# Patient Record
Sex: FEMALE
[De-identification: names, ages, dates, MRNs, and addresses within clinical notes are randomized per-mention and may not be internally consistent; named-entity substitution may affect disease eponyms.]

---
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
## 2018-03-09 ENCOUNTER — HOSPITAL ENCOUNTER (EMERGENCY)
Dept: HOSPITAL 31 - C.ER | Age: 18
Discharge: HOME | End: 2018-03-09
Payer: MEDICAID

## 2018-03-09 VITALS — BODY MASS INDEX: 24.3 KG/M2

## 2018-03-09 VITALS
OXYGEN SATURATION: 99 % | TEMPERATURE: 98.3 F | HEART RATE: 89 BPM | SYSTOLIC BLOOD PRESSURE: 106 MMHG | DIASTOLIC BLOOD PRESSURE: 71 MMHG | RESPIRATION RATE: 18 BRPM

## 2018-03-09 DIAGNOSIS — H60.92: Primary | ICD-10-CM

## 2018-03-09 NOTE — C.PDOC
History Of Present Illness


17-YEAR-OLD FEMALE, PRESENTS TO THE EMERGENCY DEPARTMENT WITH COMPLAINTS OF L 

EAR PAIN SINCE YEST. NO TRAUMA, CO "PRESSURE" TO AREA. NO FB, HEARING LOSS, URI 

SX, FEVER





EXAM


NAD


HEENT +EARLY OTITIS EXTERNA TM INTACT, SHINY NO EFFUSION. R TM WNL. NO 

RHINORRHEA


REMAINDE RNEG


Time Seen by Provider: 03/09/18 09:47


Chief Complaint (Nursing): ENT Problem


History Per: Patient


History/Exam Limitations: None





Past Medical History


Reviewed: Historical Data, Nursing Documentation, Vital Signs


Vital Signs: 


 Last Vital Signs











Temp  98.3 F   03/09/18 09:34


 


Pulse  89   03/09/18 09:34


 


Resp  18   03/09/18 09:34


 


BP  106/71   03/09/18 09:34


 


Pulse Ox  99   03/09/18 10:45














- [facility] Procedures








EXC LES SOFT TISSUE NEC (05/29/15)


OTHER LOCAL DESTRUC SKIN (03/12/15)








Family History: States: No Known Family Hx





- Social History


Hx Alcohol Use: No


Hx Substance Use: No





- Immunization History


Hx Tetanus Toxoid Vaccination: No


Hx Influenza Vaccination: No


Hx Pneumococcal Vaccination: No





Review Of Systems


ENT: Positive for: Ear Pain (+pressure).  Negative for: Ear Discharge, Nose 

Discharge, Nose Congestion, Throat Pain, Throat Swelling


Respiratory: Negative for: Cough





Physical Exam





- Physical Exam


Appears: Non-toxic, No Acute Distress


Skin: Warm, Dry, No Rash


Head: Atraumatic


Ear(s): Left: Other (+EARLY OTITIS EXTERNA TM INTACT, SHINY NO EFFUSION), Right

: Normal (R TM WNL)


Nose: Normal, No Flaring, No Discharge


Tongue: Normal Appearing


Lips: Normal Appearing


Throat: No Erythema, No Exudate


Extremity: Normal ROM, No Deformity, No Swelling


Neurological/Psych: Oriented x3, Normal Speech





ED Course And Treatment





- Laboratory Results


Urine Pregnancy POC: Negative


O2 Sat by Pulse Oximetry: 99 (RA)


Pulse Ox Interpretation: Normal





Disposition


Counseled Patient/Family Regarding: Diagnosis, Need For Followup, Rx Given





- Disposition


Referrals: 


Behin,Babak, MD [Staff Provider] - 


Disposition: HOME/ ROUTINE


Disposition Time: 09:50


Condition: GOOD


Prescriptions: 


Ciprofloxacin/Dexamethasone [Ciprodex 0.3%-0.1% 7.5 Ml] 4 drop OT BID #1 bottle


Instructions:  Outer Ear Infection


Forms:  [facility] (English)





- Clinical Impression


Clinical Impression: 


 Otitis externa








- Scribe Statement


The provider has reviewed the documentation as recorded by the Scribe (Lucia Fletcher)








All medical record entries made by the Scribe were at my direction and 

personally dictated by me. I have reviewed the chart and agree that the record 

accurately reflects my personal performance of the history, physical exam, 

medical decision making, and the department course for this patient. I have 

also personally directed, reviewed, and agree with the discharge instructions 

and disposition.

## 2018-07-27 ENCOUNTER — HOSPITAL ENCOUNTER (EMERGENCY)
Dept: HOSPITAL 31 - C.ER | Age: 18
LOS: 1 days | Discharge: HOME | End: 2018-07-28
Payer: MEDICAID

## 2018-07-27 VITALS — OXYGEN SATURATION: 98 %

## 2018-07-27 VITALS — BODY MASS INDEX: 24.3 KG/M2
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
## 2018-07-27 DIAGNOSIS — T62.8X1A: ICD-10-CM

## 2018-07-27 DIAGNOSIS — R11.2: Primary | ICD-10-CM

## 2018-07-27 LAB
BASOPHILS # BLD AUTO: 0.1 K/UL (ref 0–0.2)
BASOPHILS NFR BLD: 0.5 % (ref 0–2)
EOSINOPHIL # BLD AUTO: 0.2 K/UL (ref 0–0.7)
EOSINOPHIL NFR BLD: 2.6 % (ref 0–4)
ERYTHROCYTE [DISTWIDTH] IN BLOOD BY AUTOMATED COUNT: 14.1 % (ref 11.5–14.5)
HCG,QUALITATIVE URINE: NEGATIVE
HGB BLD-MCNC: 13 G/DL (ref 11–16)
LYMPHOCYTES # BLD AUTO: 3 K/UL (ref 1–4.3)
LYMPHOCYTES NFR BLD AUTO: 32.3 % (ref 20–40)
MCH RBC QN AUTO: 30.5 PG (ref 27–31)
MCHC RBC AUTO-ENTMCNC: 34.2 G/DL (ref 33–37)
MCV RBC AUTO: 89.1 FL (ref 81–99)
MONOCYTES # BLD: 0.7 K/UL (ref 0–0.8)
MONOCYTES NFR BLD: 8.1 % (ref 0–10)
NEUTROPHILS # BLD: 5.2 K/UL (ref 1.8–7)
NEUTROPHILS NFR BLD AUTO: 56.5 % (ref 50–75)
NRBC BLD AUTO-RTO: 0 % (ref 0–2)
PLATELET # BLD: 304 K/UL (ref 130–400)
PMV BLD AUTO: 8.4 FL (ref 7.2–11.7)
RBC # BLD AUTO: 4.25 MIL/UL (ref 3.8–5.2)
WBC # BLD AUTO: 9.2 K/UL (ref 4.8–10.8)

## 2018-07-27 PROCEDURE — 99284 EMERGENCY DEPT VISIT MOD MDM: CPT

## 2018-07-27 PROCEDURE — 85730 THROMBOPLASTIN TIME PARTIAL: CPT

## 2018-07-27 PROCEDURE — 96374 THER/PROPH/DIAG INJ IV PUSH: CPT

## 2018-07-27 PROCEDURE — 85610 PROTHROMBIN TIME: CPT

## 2018-07-27 PROCEDURE — 80053 COMPREHEN METABOLIC PANEL: CPT

## 2018-07-27 PROCEDURE — 85025 COMPLETE CBC W/AUTO DIFF WBC: CPT

## 2018-07-27 PROCEDURE — 84703 CHORIONIC GONADOTROPIN ASSAY: CPT

## 2018-07-27 PROCEDURE — 96361 HYDRATE IV INFUSION ADD-ON: CPT

## 2018-07-27 PROCEDURE — 81001 URINALYSIS AUTO W/SCOPE: CPT

## 2018-07-27 PROCEDURE — 83690 ASSAY OF LIPASE: CPT

## 2018-07-27 PROCEDURE — 96375 TX/PRO/DX INJ NEW DRUG ADDON: CPT

## 2018-07-27 NOTE — C.PDOC
History Of Present Illness


17 year old female presents to the ER with a complaint of vomiting after eating 

rice and chicken today, associated with mild abdominal pain and diarrhea. 

Patient states she noticed some small streaks of blood in the vomit. Denies 

fever or chills.


Time Seen by Provider: 07/27/18 23:27


Chief Complaint (Nursing): GI Problem


History Per: Patient


History/Exam Limitations: no limitations


Onset/Duration Of Symptoms: Hrs


Current Symptoms Are (Timing): Still Present


Context: Food


Severity: Moderate


Pain Scale Rating Of: 4


Location Of Pain/Discomfort: Epigastric


Radiation Of Pain To:: None


Quality Of Discomfort: Unable To Describe


Associated Symptoms: denies: Fever, Chills


Exacerbating Factors: None


Alleviating Factors: None


Recent travel outside of the [place]: No


Abnormal Vaginal Bleeding: No





Past Medical History


Reviewed: Historical Data, Nursing Documentation, Vital Signs


Vital Signs: 


 Last Vital Signs











Temp  97.1 F L  07/28/18 00:56


 


Pulse  84   07/28/18 00:56


 


Resp  20   07/28/18 00:56


 


BP  109/66   07/28/18 00:56


 


Pulse Ox  98   07/28/18 00:56














- Medical History


PMH: 


   Denies: Bipolar Disorder, Bronchitis, Diabetes, Diverticulitis, Hepatitis, 

HIV


Surgical History: 


   Denies: Appendectomy, CABG, Carotid Endarterectomy, Cholecystectomy, 

Coronary Stent, Endoscopy, Pacemaker, Tonsillectomy





- [facility] Procedures








EXC LES SOFT TISSUE NEC (05/29/15)


OTHER LOCAL DESTRUC SKIN (03/12/15)








Family History: States: No Known Family Hx





- Social History


Hx Alcohol Use: No


Hx Substance Use: No





- Immunization History


Hx Tetanus Toxoid Vaccination: No


Hx Influenza Vaccination: No


Hx Pneumococcal Vaccination: No





Review Of Systems


Constitutional: Negative for: Fever, Chills


Respiratory: Negative for: Cough, Shortness of Breath


Gastrointestinal: Positive for: Vomiting, Abdominal Pain, Diarrhea


Genitourinary: Negative for: Dysuria, Hematuria


Skin: Negative for: Rash





Physical Exam





- Physical Exam


Appears: Non-toxic


Skin: Warm, Dry


Head: Normacephalic


Oral Mucosa: Moist


Throat: No Erythema, No Exudate, No Other (Bleeding or lesions)


Chest: Symmetrical, No Tenderness


Cardiovascular: Rhythm Regular


Respiratory: No Rales, No Rhonchi, No Wheezing


Gastrointestinal/Abdominal: Soft, Tenderness (Mild epigastric), No Guarding, No 

Rebound


Back: No CVA Tenderness


Neurological/Psych: Oriented x3





ED Course And Treatment





- Laboratory Results


Result Diagrams: 


 07/27/18 23:46





 07/27/18 23:46


O2 Sat by Pulse Oximetry: 98 (Room air)


Pulse Ox Interpretation: Normal


Progress Note: Blood work and urinalysis ordered. Pepcid, zofran, and IV fluids 

administered.





Disposition


Counseled Patient/Family Regarding: Studies Performed, Diagnosis, Need For 

Followup, Rx Given





- Disposition


Referrals: 


Jeff Byrd MD [Staff Provider] - 


Disposition: HOME/ ROUTINE


Disposition Time: 23:27


Condition: FAIR


Additional Instructions: 


Please return if symptoms  recur


Prescriptions: 


Ondansetron ODT [Zofran ODT] 1 odt PO BID PRN #6 odt


 PRN Reason: Nausea/Vomiting


Instructions:  Food Poisoning (DC), Nausea and Vomiting, Adult (DC)


Forms:  [facility] (English)





- Clinical Impression


Clinical Impression: 


 Nausea & vomiting, Food poisoning








- Scribe Statement


The provider has reviewed the documentation as recorded by the Scribflorida Keller





All medical record entries made by the Scribe were at my direction and 

personally dictated by me. I have reviewed the chart and agree that the record 

accurately reflects my personal performance of the history, physical exam, 

medical decision making, and the department course for this patient. I have 

also personally directed, reviewed, and agree with the discharge instructions 

and disposition.

## 2018-07-28 VITALS
RESPIRATION RATE: 20 BRPM | DIASTOLIC BLOOD PRESSURE: 66 MMHG | TEMPERATURE: 97.1 F | HEART RATE: 84 BPM | SYSTOLIC BLOOD PRESSURE: 109 MMHG

## 2018-07-28 LAB
ALBUMIN SERPL-MCNC: 4.5 G/DL (ref 3.5–5)
ALBUMIN/GLOB SERPL: 1.5 {RATIO} (ref 1–2.1)
ALT SERPL-CCNC: 37 U/L (ref 9–52)
APTT BLD: 34 SECONDS (ref 21–34)
AST SERPL-CCNC: 28 U/L (ref 14–36)
BILIRUB UR-MCNC: NEGATIVE MG/DL
BUN SERPL-MCNC: 12 MG/DL (ref 7–17)
CALCIUM SERPL-MCNC: 9.1 MG/DL (ref 8.6–10.4)
GFR NON-AFRICAN AMERICAN: > 60
GLUCOSE UR STRIP-MCNC: NORMAL MG/DL
INR PPP: 1
LEUKOCYTE ESTERASE UR-ACNC: (no result) LEU/UL
LIPASE: 82 U/L (ref 23–300)
PH UR STRIP: 6 [PH] (ref 5–8)
PROT UR STRIP-MCNC: (no result) MG/DL
PROTHROMBIN TIME: 11.3 SECONDS (ref 9.7–12.2)
RBC # UR STRIP: (no result) /UL
SP GR UR STRIP: 1.02 (ref 1–1.03)
SQUAMOUS EPITHIAL: 5 /HPF (ref 0–5)
UROBILINOGEN UR-MCNC: NORMAL MG/DL (ref 0.2–1)

## 2018-08-12 ENCOUNTER — HOSPITAL ENCOUNTER (EMERGENCY)
Dept: HOSPITAL 31 - C.ER | Age: 18
Discharge: HOME | End: 2018-08-12
Payer: COMMERCIAL

## 2018-08-12 VITALS
DIASTOLIC BLOOD PRESSURE: 65 MMHG | SYSTOLIC BLOOD PRESSURE: 102 MMHG | OXYGEN SATURATION: 97 % | HEART RATE: 94 BPM | TEMPERATURE: 98 F

## 2018-08-12 VITALS — RESPIRATION RATE: 18 BRPM

## 2018-08-12 VITALS — BODY MASS INDEX: 24.3 KG/M2

## 2018-08-12 DIAGNOSIS — F41.9: ICD-10-CM

## 2018-08-12 DIAGNOSIS — R07.89: Primary | ICD-10-CM

## 2018-08-12 LAB
ALBUMIN SERPL-MCNC: 5.1 G/DL (ref 3.5–5)
ALBUMIN/GLOB SERPL: 1.3 {RATIO} (ref 1–2.1)
ALT SERPL-CCNC: 37 U/L (ref 9–52)
AST SERPL-CCNC: 33 U/L (ref 14–36)
BASOPHILS # BLD AUTO: 0 K/UL (ref 0–0.2)
BASOPHILS NFR BLD: 0.2 % (ref 0–2)
BILIRUB UR-MCNC: NEGATIVE MG/DL
BUN SERPL-MCNC: 11 MG/DL (ref 7–17)
CALCIUM SERPL-MCNC: 9.8 MG/DL (ref 8.6–10.4)
EOSINOPHIL # BLD AUTO: 0.2 K/UL (ref 0–0.7)
EOSINOPHIL NFR BLD: 0.7 % (ref 0–4)
ERYTHROCYTE [DISTWIDTH] IN BLOOD BY AUTOMATED COUNT: 13.8 % (ref 11.5–14.5)
GFR NON-AFRICAN AMERICAN: > 60
GLUCOSE UR STRIP-MCNC: NORMAL MG/DL
HGB BLD-MCNC: 14 G/DL (ref 11–16)
LEUKOCYTE ESTERASE UR-ACNC: (no result) LEU/UL
LYMPHOCYTES # BLD AUTO: 3.2 K/UL (ref 1–4.3)
LYMPHOCYTES NFR BLD AUTO: 12.6 % (ref 20–40)
MCH RBC QN AUTO: 29.7 PG (ref 27–31)
MCHC RBC AUTO-ENTMCNC: 33.4 G/DL (ref 33–37)
MCV RBC AUTO: 88.9 FL (ref 81–99)
MONOCYTES # BLD: 1.1 K/UL (ref 0–0.8)
MONOCYTES NFR BLD: 4.3 % (ref 0–10)
NEUTROPHILS # BLD: 20.9 K/UL (ref 1.8–7)
NEUTROPHILS NFR BLD AUTO: 82.2 % (ref 50–75)
NRBC BLD AUTO-RTO: 0 % (ref 0–2)
PH UR STRIP: 5 [PH] (ref 5–8)
PLATELET # BLD: 369 K/UL (ref 130–400)
PMV BLD AUTO: 8 FL (ref 7.2–11.7)
PROT UR STRIP-MCNC: NEGATIVE MG/DL
RBC # BLD AUTO: 4.71 MIL/UL (ref 3.8–5.2)
RBC # UR STRIP: NEGATIVE /UL
SP GR UR STRIP: 1 (ref 1–1.03)
SQUAMOUS EPITHIAL: < 1 /HPF (ref 0–5)
UROBILINOGEN UR-MCNC: NORMAL MG/DL (ref 0.2–1)
WBC # BLD AUTO: 25.4 K/UL (ref 4.8–10.8)

## 2018-08-12 PROCEDURE — 80361 OPIATES 1 OR MORE: CPT

## 2018-08-12 PROCEDURE — 71275 CT ANGIOGRAPHY CHEST: CPT

## 2018-08-12 PROCEDURE — 80324 DRUG SCREEN AMPHETAMINES 1/2: CPT

## 2018-08-12 PROCEDURE — 80358 DRUG SCREENING METHADONE: CPT

## 2018-08-12 PROCEDURE — 80353 DRUG SCREENING COCAINE: CPT

## 2018-08-12 PROCEDURE — 96365 THER/PROPH/DIAG IV INF INIT: CPT

## 2018-08-12 PROCEDURE — 83992 ASSAY FOR PHENCYCLIDINE: CPT

## 2018-08-12 PROCEDURE — 80345 DRUG SCREENING BARBITURATES: CPT

## 2018-08-12 PROCEDURE — 83690 ASSAY OF LIPASE: CPT

## 2018-08-12 PROCEDURE — 81001 URINALYSIS AUTO W/SCOPE: CPT

## 2018-08-12 PROCEDURE — 84703 CHORIONIC GONADOTROPIN ASSAY: CPT

## 2018-08-12 PROCEDURE — 80053 COMPREHEN METABOLIC PANEL: CPT

## 2018-08-12 PROCEDURE — 96361 HYDRATE IV INFUSION ADD-ON: CPT

## 2018-08-12 PROCEDURE — 85025 COMPLETE CBC W/AUTO DIFF WBC: CPT

## 2018-08-12 PROCEDURE — 99285 EMERGENCY DEPT VISIT HI MDM: CPT

## 2018-08-12 PROCEDURE — 96375 TX/PRO/DX INJ NEW DRUG ADDON: CPT

## 2018-08-12 PROCEDURE — 93005 ELECTROCARDIOGRAM TRACING: CPT

## 2018-08-12 PROCEDURE — 84443 ASSAY THYROID STIM HORMONE: CPT

## 2018-08-12 PROCEDURE — 80349 CANNABINOIDS NATURAL: CPT

## 2018-08-12 PROCEDURE — 80346 BENZODIAZEPINES1-12: CPT

## 2018-08-12 PROCEDURE — 85378 FIBRIN DEGRADE SEMIQUANT: CPT

## 2018-08-12 NOTE — CT
Date of service: 



08/12/2018



PROCEDURE:  CT Chest with contrast (Pulmonary Angiogram)



HISTORY:

sob, tachy, cp



COMPARISON:

None available.



TECHNIQUE:

Axial computed tomography images were obtained of the chest in the 

pulmonary arterial phase of enhancement. Coronal and sagittal 

reformatted images were created and reviewed.



Intravenous contrast dose: 100 mL Visipaque 320



Radiation dose:



Total exam DLP = 295.41 mGy-cm.



This CT exam was performed using one or more of the following dose 

reduction techniques: Automated exposure control, adjustment of the 

mA and/or kV according to patient size, and/or use of iterative 

reconstruction technique.



FINDINGS:



PULMONARY ARTERIES:

Unremarkable. No pulmonary embolism. 



AORTA:

No acute findings. No thoracic aortic aneurysm. 



LUNGS:

There are small opacities seen at the lung bases likely represent 

atelectasis. Mild pulmonary vascular congestion is noted. 



PLEURAL SPACES:

No evidence of significant pleural effusion.  No evidence of 

pneumothorax.  



HEART:

Heart is mildly enlarged. The main pulmonary artery is mildly 

enlarged. 



LYMPH NODES:

No lymphadenopathy.



BONES, CHEST WALL:

Unremarkable. No fracture or destructive lesion 



OTHER FINDINGS:

Unremarkable. 



IMPRESSION:

No evidence of acute pulmonary embolus.



Small opacities at the lung bases likely represent atelectasis.  Mild 

pulmonary vascular congestion.



Mild cardiomegaly.

## 2018-08-12 NOTE — C.PDOC
History Of Present Illness


pt woke up from sleep feeling very anxious, having some palpitations. Denies 

any emotional issues, no drugs, no meds. Feels better now. ALso felt some 

tingling in her fingers, which has since resolved. Speaking in complete 

sentences. No f/c/n/v


Time Seen by Provider: 08/12/18 04:00


Chief Complaint (Nursing): Chest Pain





Past Medical History


Reviewed: Historical Data, Nursing Documentation, Vital Signs


Vital Signs: 


 Last Vital Signs











Temp  99 F   08/12/18 03:40


 


Pulse  111 H  08/12/18 03:40


 


Resp  20   08/12/18 03:40


 


BP  117/66   08/12/18 03:40


 


Pulse Ox  99   08/12/18 05:56














- Medical History


PMH: 


   Denies: Anemia, Anxiety, Arthritis, Asthma, Bipolar Disorder, Bronchitis, 

Cardia Arrhythmia, CHF, Colonic Polyps, COPD, Crohn's Disease, Hepatitis, HIV, 

HTN, Hypercholesterolemia, Kidney Stones, Mitral Valve Prolapse, Osteoporosis, 

Pancreatitis, Paranoia, Peripheral Edema, Pneumonia, Rheumatoid Arthritis


Surgical History: 


   Denies: Appendectomy, CABG, Carotid Endarterectomy, Cholecystectomy, 

Coronary Stent, Endoscopy, Pacemaker, Tonsillectomy





- [facility] Procedures








EXC LES SOFT TISSUE NEC (05/29/15)


OTHER LOCAL DESTRUC SKIN (03/12/15)








Family History: States: No Known Family Hx





- Social History


Hx Alcohol Use: No


Hx Substance Use: No





- Immunization History


Hx Tetanus Toxoid Vaccination: No


Hx Influenza Vaccination: No


Hx Pneumococcal Vaccination: No





Review Of Systems


Constitutional: Negative for: Fever, Chills


Cardiovascular: Negative for: Chest Pain


Respiratory: Positive for: Shortness of Breath (hyperventilating)


Gastrointestinal: Negative for: Nausea, Abdominal Pain


Musculoskeletal: Negative for: Back Pain


Skin: Negative for: Rash


Neurological: Negative for: Weakness


Psych: Positive for: Anxiety





Physical Exam





- Physical Exam


Appears: Non-toxic, No Acute Distress


Skin: Warm, Dry


Head: Normacephalic


Eye(s): bilateral: Normal Inspection


Oral Mucosa: Moist


Neck: Supple


Chest: Symmetrical, Tenderness (reproducible)


Cardiovascular: Rhythm Regular


Respiratory: No Rales, No Rhonchi, No Wheezing


Gastrointestinal/Abdominal: Soft, No Tenderness, No Distention


Back: Normal Inspection


Extremity: Normal ROM


Extremity: Bilateral: Atraumatic


Pulses: Left Dorsalis Pedis: Normal, Right Dorsalis Pedis: Normal


Neurological/Psych: Oriented x3, Normal Speech, Normal Cognition


Gait: Steady





ED Course And Treatment





- Laboratory Results


Result Diagrams: 


 08/12/18 04:42





 08/12/18 04:42


ECG: Interpreted By Me, Viewed By Me


ECG Rhythm: Sinus Tachycardia (116), Nonspecific Changes


O2 Sat by Pulse Oximetry: 99


Pulse Ox Interpretation: Normal





Disposition


Counseled Patient/Family Regarding: Studies Performed, Diagnosis





- Disposition


Disposition Time: 04:00


Condition: FAIR


Forms:  [facility] (English)





- Clinical Impression


Clinical Impression: 


 Anxiety, Chest discomfort








Physician Patient Turnover


Patient Signed Over To: Ze Contreras DO


Handoff Comments: pending cta, re-eval and disposition

## 2018-08-13 NOTE — CARD
--------------- APPROVED REPORT --------------





Date of service: 08/12/2018



EKG Measurement

Heart [id number]

UT [id number]

[id number]

[id number]

[id number]



<Conclusion>

Sinus tachycardia

Possible Left atrial enlargement

Borderline ECG

## 2019-02-25 ENCOUNTER — HOSPITAL ENCOUNTER (EMERGENCY)
Dept: HOSPITAL 31 - C.ER | Age: 19
Discharge: HOME | End: 2019-02-25
Payer: COMMERCIAL

## 2019-02-25 VITALS
TEMPERATURE: 98.6 F | RESPIRATION RATE: 17 BRPM | DIASTOLIC BLOOD PRESSURE: 78 MMHG | SYSTOLIC BLOOD PRESSURE: 128 MMHG | HEART RATE: 89 BPM | OXYGEN SATURATION: 99 %

## 2019-02-25 VITALS — BODY MASS INDEX: 24.3 KG/M2

## 2019-02-25 DIAGNOSIS — Z00.00: Primary | ICD-10-CM

## 2019-02-25 LAB
ALBUMIN SERPL-MCNC: 5.1 G/DL (ref 3.5–5)
ALBUMIN/GLOB SERPL: 1.5 {RATIO} (ref 1–2.1)
ALT SERPL-CCNC: 18 U/L (ref 9–52)
AST SERPL-CCNC: 40 U/L (ref 14–36)
BACTERIA #/AREA URNS HPF: (no result) /[HPF]
BASOPHILS # BLD AUTO: 0.1 K/UL (ref 0–0.2)
BASOPHILS NFR BLD: 1.4 % (ref 0–2)
BILIRUB UR-MCNC: NEGATIVE MG/DL
BUN SERPL-MCNC: 14 MG/DL (ref 7–17)
CALCIUM SERPL-MCNC: 9.6 MG/DL (ref 8.6–10.4)
EOSINOPHIL # BLD AUTO: 0.2 K/UL (ref 0–0.7)
EOSINOPHIL NFR BLD: 2.1 % (ref 0–4)
ERYTHROCYTE [DISTWIDTH] IN BLOOD BY AUTOMATED COUNT: 14.3 % (ref 11.5–14.5)
GFR NON-AFRICAN AMERICAN: > 60
GLUCOSE UR STRIP-MCNC: NORMAL MG/DL
HCG,QUALITATIVE URINE: NEGATIVE
HGB BLD-MCNC: 14 G/DL (ref 11–16)
LEUKOCYTE ESTERASE UR-ACNC: (no result) LEU/UL
LYMPHOCYTES # BLD AUTO: 3.1 K/UL (ref 1–4.3)
LYMPHOCYTES NFR BLD AUTO: 29.7 % (ref 20–40)
MCH RBC QN AUTO: 29.7 PG (ref 27–31)
MCHC RBC AUTO-ENTMCNC: 32.9 G/DL (ref 33–37)
MCV RBC AUTO: 90.4 FL (ref 81–99)
MONOCYTES # BLD: 0.5 K/UL (ref 0–0.8)
MONOCYTES NFR BLD: 5.1 % (ref 0–10)
NEUTROPHILS # BLD: 6.5 K/UL (ref 1.8–7)
NEUTROPHILS NFR BLD AUTO: 61.7 % (ref 50–75)
NRBC BLD AUTO-RTO: 0.1 % (ref 0–2)
PH UR STRIP: 6 [PH] (ref 5–8)
PLATELET # BLD: 350 K/UL (ref 130–400)
PMV BLD AUTO: 8.1 FL (ref 7.2–11.7)
PROT UR STRIP-MCNC: NEGATIVE MG/DL
RBC # BLD AUTO: 4.7 MIL/UL (ref 3.8–5.2)
RBC # UR STRIP: NEGATIVE /UL
SP GR UR STRIP: 1.02 (ref 1–1.03)
SQUAMOUS EPITHIAL: 10 /HPF (ref 0–5)
UROBILINOGEN UR-MCNC: NORMAL MG/DL (ref 0.2–1)
WBC # BLD AUTO: 10.5 K/UL (ref 4.8–10.8)

## 2019-02-25 NOTE — C.PDOC
History Of Present Illness





Patient presents 2 months pregnant LMP 12/07/19, saw her OBGYN who said she 

could not see her fetus and sent her here. She reports lower abdominal 

discomfort. Denies vaginal bleeding, fever, chills, nausea, or vomiting.





Time Seen by Provider: 02/25/19 19:30


Chief Complaint (Nursing): Female Genitourinary


History Per: Patient


History/Exam Limitations: no limitations


Onset/Duration Of Symptoms: Days


Current Symptoms Are (Timing): Still Present


Severity: Moderate


Pain Scale Rating Of: 4


Recent travel outside of the [place]: No





Past Medical History


Reviewed: Historical Data, Nursing Documentation, Vital Signs


Vital Signs: 





                                Last Vital Signs











Temp  98.2 F   02/25/19 18:57


 


Pulse  92 H  02/25/19 18:57


 


Resp  18   02/25/19 18:57


 


BP  137/81   02/25/19 18:57


 


Pulse Ox  100   02/25/19 18:57














- Medical History


PMH: 


   Denies: Anemia, Anxiety, Arthritis, Asthma, Bipolar Disorder, Bronchitis, 

Cardia Arrhythmia, CHF, Colonic Polyps, COPD, Crohn's Disease, Depression, 

Diabetes, Diverticulitis, Emphysema, Fractures, Gastritis, Gall Bladder Disease,

Hepatitis, HIV, HTN, Hypercholesterolemia, Kidney Stones, Mitral Valve Prolapse,

Osteoporosis, Pancreatitis, Paranoia, Peripheral Edema, Pneumonia, Post 

Traumatic Stress Disorder, Pulmonary Embolism, Chronic Kidney Disease, 

Rheumatoid Arthritis, Schizophrenia, Seizures, Sickle Cell Disease, Sexually 

Transmitted Disease, Sleep Apnea


Surgical History: 


   Denies: Appendectomy, CABG, Carotid Endarterectomy, Cholecystectomy, Coronary

Stent, Endoscopy, Pacemaker, Tonsillectomy





- [facility] Procedures











EXC LES SOFT TISSUE NEC (05/29/15)


OTHER LOCAL DESTRUC SKIN (03/12/15)








Family History: States: No Known Family Hx





- Social History


Hx Alcohol Use: No


Hx Substance Use: No





- Immunization History


Hx Tetanus Toxoid Vaccination: No


Hx Influenza Vaccination: No


Hx Pneumococcal Vaccination: No





Review Of Systems


Constitutional: Negative for: Fever, Chills


Cardiovascular: Negative for: Chest Pain, Palpitations


Respiratory: Negative for: Cough, Shortness of Breath


Gastrointestinal: Positive for: Abdominal Pain.  Negative for: Nausea, Vomiting


Genitourinary: Negative for: Vaginal Bleeding


Neurological: Negative for: Weakness, Numbness





Physical Exam





- Physical Exam


Appears: Non-toxic


Skin: Warm, Dry


Head: Normacephalic


Oral Mucosa: Moist


Chest: Symmetrical, No Tenderness


Cardiovascular: Rhythm Regular


Respiratory: No Rales, No Rhonchi, No Wheezing


Gastrointestinal/Abdominal: Soft, Tenderness (Mild suprapubic), No Guarding, No 

Rebound


Back: No CVA Tenderness


Neurological/Psych: Oriented x3





ED Course And Treatment





- Laboratory Results


Result Diagrams: 


                                 02/25/19 19:46





                                 02/25/19 19:46


O2 Sat by Pulse Oximetry: 100 (Room air)


Pulse Ox Interpretation: Normal


Progress Note: Blood work and urinalysis ordered. IV fluids administered.


Reevaluation Time: 20:54


Reassessment Condition: Improved





Disposition


Counseled Patient/Family Regarding: Studies Performed, Diagnosis





- Disposition


Referrals: 


[facility] [Outside]


Disposition: HOME/ ROUTINE


Disposition Time: 19:30


Condition: FAIR


Forms:  [facility] (English), General Discharge Instructions





- Clinical Impression


Clinical Impression: 


 Encounter for medical assessment








- Scribe Statement


The provider has reviewed the documentation as recorded by the Scribe





David Keller





All medical record entries made by the Scribe were at my direction and 

personally dictated by me. I have reviewed the chart and agree that the record 

accurately reflects my personal performance of the history, physical exam, 

medical decision making, and the department course for this patient. I have also

 personally directed, reviewed, and agree with the discharge instructions and 

disposition.